# Patient Record
Sex: MALE | URBAN - METROPOLITAN AREA
[De-identification: names, ages, dates, MRNs, and addresses within clinical notes are randomized per-mention and may not be internally consistent; named-entity substitution may affect disease eponyms.]

---

## 2022-09-08 ENCOUNTER — LAB OUTSIDE AN ENCOUNTER (OUTPATIENT)
Dept: URBAN - METROPOLITAN AREA CLINIC 98 | Facility: CLINIC | Age: 47
End: 2022-09-08

## 2022-09-08 ENCOUNTER — OFFICE VISIT (OUTPATIENT)
Dept: URBAN - METROPOLITAN AREA CLINIC 98 | Facility: CLINIC | Age: 47
End: 2022-09-08
Payer: COMMERCIAL

## 2022-09-08 VITALS
HEART RATE: 74 BPM | TEMPERATURE: 97.9 F | SYSTOLIC BLOOD PRESSURE: 110 MMHG | WEIGHT: 172.8 LBS | BODY MASS INDEX: 26.19 KG/M2 | HEIGHT: 68 IN | DIASTOLIC BLOOD PRESSURE: 73 MMHG

## 2022-09-08 DIAGNOSIS — K76.0 FATTY (CHANGE OF) LIVER, NOT ELSEWHERE CLASSIFIED: ICD-10-CM

## 2022-09-08 DIAGNOSIS — Z12.11 COLON CANCER SCREENING: ICD-10-CM

## 2022-09-08 PROCEDURE — 99203 OFFICE O/P NEW LOW 30 MIN: CPT | Performed by: INTERNAL MEDICINE

## 2022-09-08 NOTE — HPI-TODAY'S VISIT:
wife is my patient; he was diagnosed w fatty liver when he was at [facility] a few years ago for kidney stones.  no more issues with stones.  says he feels full easily but otherwise has no complaints.  wt has been stable. denies alcohol use. no medications or supplements

## 2022-09-08 NOTE — HPI-OTHER HISTORIES
CT 2018  EXAM: CT Abdomen + Pelvis w/o IV Contrast  CLINICAL INDICATION: Hydronephrosis of left kidney. Initially presented with fever, left flank pain, dysuria  TECHNIQUE: Noncontrast images were obtained through the abdomen and pelvis.  COMPARISON: 8/27/2018 CT abdomen and pelvis  FINDINGS:  Lower Thorax: Normal. No pleural effusions. Lung bases are clear.  Liver: Severe diffuse hepatic steatosis with sparing of the gallbladder fossa. Otherwise normal size and contour.  Gallbladder/Biliary Tree: Collapsed gallbladder.  Spleen: Normal.  Pancreas: Normal.  Adrenal Glands: Normal.  Kidneys/Ureters: Unchanged partially exophytic 1.2 x 2.3 cm cyst. Interval resolution of mild left hydroureteronephrosis.  Gastrointestinal: Normal appendix. No bowel obstruction.   Bladder: Normal.  Prostate/Seminal Vesicles: Unchanged enlarged prostate measuring up to 5.1 cm in diameter.  Lymph Nodes: Normal.  Vessels: Normal.  Peritoneum/Retroperitoneum: Normal.  Bones/Soft Tissues: Normal.  IMPRESSION:  1.  Interval resolution of mild left hydroureteronephrosis. No nephroureterolithiasis. 2.  Severe diffuse hepatic steatosis. 3.  Prostatomegaly.

## 2022-09-09 LAB
A/G RATIO: 1.9
ALBUMIN: 5
ALKALINE PHOSPHATASE: 68
ALT (SGPT): 83
AST (SGOT): 35
BASO (ABSOLUTE): 0
BASOS: 1
BILIRUBIN, TOTAL: 0.3
BUN/CREATININE RATIO: 9
BUN: 7
CALCIUM: 10
CARBON DIOXIDE, TOTAL: 25
CHLORIDE: 103
CREATININE: 0.76
EGFR: 111
EOS (ABSOLUTE): 0.1
EOS: 3
GGT: 50
GLOBULIN, TOTAL: 2.7
GLUCOSE: 76
HBSAG SCREEN: NEGATIVE
HCV AB: <0.1
HEMATOCRIT: 45.7
HEMATOLOGY COMMENTS:: (no result)
HEMOGLOBIN: 15.4
HEP A AB, TOTAL: POSITIVE
HEP B CORE AB, TOT: NEGATIVE
HEPATITIS B SURF AB QUANT: 37.3
IMMATURE CELLS: (no result)
IMMATURE GRANS (ABS): 0
IMMATURE GRANULOCYTES: 0
INTERPRETATION:: (no result)
LYMPHS (ABSOLUTE): 1.6
LYMPHS: 30
MCH: 30.4
MCHC: 33.7
MCV: 90
MONOCYTES(ABSOLUTE): 0.5
MONOCYTES: 10
NEUTROPHILS (ABSOLUTE): 2.9
NEUTROPHILS: 56
NRBC: (no result)
PLATELETS: 247
POTASSIUM: 4.4
PROTEIN, TOTAL: 7.7
RBC: 5.07
RDW: 13.1
SODIUM: 143
WBC: 5.1

## 2022-09-11 ENCOUNTER — TELEPHONE ENCOUNTER (OUTPATIENT)
Dept: URBAN - METROPOLITAN AREA CLINIC 98 | Facility: CLINIC | Age: 47
End: 2022-09-11

## 2022-09-13 ENCOUNTER — OFFICE VISIT (OUTPATIENT)
Dept: URBAN - METROPOLITAN AREA CLINIC 97 | Facility: CLINIC | Age: 47
End: 2022-09-13

## 2022-09-15 ENCOUNTER — OFFICE VISIT (OUTPATIENT)
Dept: URBAN - METROPOLITAN AREA CLINIC 95 | Facility: CLINIC | Age: 47
End: 2022-09-15
Payer: COMMERCIAL

## 2022-09-15 DIAGNOSIS — K76.0 FATTY (CHANGE OF) LIVER, NOT ELSEWHERE CLASSIFIED: ICD-10-CM

## 2022-09-15 DIAGNOSIS — N28.1 KIDNEY CYST, ACQUIRED: ICD-10-CM

## 2022-09-15 PROCEDURE — 76700 US EXAM ABDOM COMPLETE: CPT | Performed by: INTERNAL MEDICINE

## 2022-09-18 ENCOUNTER — TELEPHONE ENCOUNTER (OUTPATIENT)
Dept: URBAN - METROPOLITAN AREA CLINIC 98 | Facility: CLINIC | Age: 47
End: 2022-09-18

## 2023-03-15 ENCOUNTER — LAB OUTSIDE AN ENCOUNTER (OUTPATIENT)
Dept: URBAN - METROPOLITAN AREA CLINIC 98 | Facility: CLINIC | Age: 48
End: 2023-03-15

## 2023-03-16 LAB
A/G RATIO: 1.8
ALBUMIN: 4.9
ALKALINE PHOSPHATASE: 90
ALT (SGPT): 76
AST (SGOT): 33
BASO (ABSOLUTE): 0
BASOS: 1
BILIRUBIN, TOTAL: 0.4
BUN/CREATININE RATIO: 17
BUN: 14
CALCIUM: 9.9
CARBON DIOXIDE, TOTAL: 25
CHLORIDE: 99
CHOLESTEROL, TOTAL: 203
COMMENT:: (no result)
CREATININE: 0.84
EGFR: 108
EOS (ABSOLUTE): 0.1
EOS: 2
GLOBULIN, TOTAL: 2.7
GLUCOSE: 122
HDL CHOLESTEROL: 39
HEMATOCRIT: 44.1
HEMATOLOGY COMMENTS:: (no result)
HEMOGLOBIN: 15.1
IMMATURE CELLS: (no result)
IMMATURE GRANS (ABS): 0
IMMATURE GRANULOCYTES: 0
LDL CHOL CALC (NIH): 119
LYMPHS (ABSOLUTE): 1.6
LYMPHS: 35
MCH: 30.4
MCHC: 34.2
MCV: 89
MONOCYTES(ABSOLUTE): 0.6
MONOCYTES: 12
NEUTROPHILS (ABSOLUTE): 2.2
NEUTROPHILS: 50
NRBC: (no result)
PLATELETS: 234
POTASSIUM: 4.3
PROTEIN, TOTAL: 7.6
RBC: 4.97
RDW: 12.7
SODIUM: 136
TRIGLYCERIDES: 257
VLDL CHOLESTEROL CAL: 45
WBC: 4.5

## 2023-03-18 ENCOUNTER — LAB OUTSIDE AN ENCOUNTER (OUTPATIENT)
Dept: URBAN - METROPOLITAN AREA CLINIC 98 | Facility: CLINIC | Age: 48
End: 2023-03-18

## 2023-03-23 ENCOUNTER — OFFICE VISIT (OUTPATIENT)
Dept: URBAN - METROPOLITAN AREA CLINIC 98 | Facility: CLINIC | Age: 48
End: 2023-03-23
Payer: COMMERCIAL

## 2023-03-23 VITALS
HEIGHT: 68 IN | TEMPERATURE: 97 F | BODY MASS INDEX: 26.22 KG/M2 | SYSTOLIC BLOOD PRESSURE: 114 MMHG | HEART RATE: 77 BPM | DIASTOLIC BLOOD PRESSURE: 84 MMHG | WEIGHT: 173 LBS

## 2023-03-23 DIAGNOSIS — Z12.11 COLON CANCER SCREENING: ICD-10-CM

## 2023-03-23 DIAGNOSIS — E78.5 DYSLIPIDEMIA: ICD-10-CM

## 2023-03-23 DIAGNOSIS — K76.0 FATTY (CHANGE OF) LIVER, NOT ELSEWHERE CLASSIFIED: ICD-10-CM

## 2023-03-23 PROCEDURE — 99213 OFFICE O/P EST LOW 20 MIN: CPT | Performed by: INTERNAL MEDICINE

## 2023-09-15 LAB
A/G RATIO: 2.2
ALBUMIN: 4.8
ALKALINE PHOSPHATASE: 68
ALT (SGPT): 67
AST (SGOT): 33
BASO (ABSOLUTE): 0
BASOS: 1
BILIRUBIN, TOTAL: 0.5
BUN/CREATININE RATIO: 11
BUN: 10
CALCIUM: 9.3
CARBON DIOXIDE, TOTAL: 22
CHLORIDE: 101
CHOLESTEROL, TOTAL: 128
COMMENT:: (no result)
CREATININE: 0.9
EGFR: 105
EOS (ABSOLUTE): 0.1
EOS: 2
GLOBULIN, TOTAL: 2.2
GLUCOSE: 136
HDL CHOLESTEROL: 40
HEMATOCRIT: 45.2
HEMATOLOGY COMMENTS:: (no result)
HEMOGLOBIN: 14.9
IMMATURE CELLS: (no result)
IMMATURE GRANS (ABS): 0
IMMATURE GRANULOCYTES: 0
LDL CHOL CALC (NIH): 66
LYMPHS (ABSOLUTE): 1.5
LYMPHS: 32
MCH: 30.5
MCHC: 33
MCV: 93
MONOCYTES(ABSOLUTE): 0.5
MONOCYTES: 10
NEUTROPHILS (ABSOLUTE): 2.7
NEUTROPHILS: 55
NRBC: (no result)
PLATELETS: 192
POTASSIUM: 4.6
PROTEIN, TOTAL: 7
RBC: 4.88
RDW: 13.2
SODIUM: 138
TRIGLYCERIDES: 120
VLDL CHOLESTEROL CAL: 22
WBC: 4.9

## 2023-09-21 ENCOUNTER — OFFICE VISIT (OUTPATIENT)
Dept: URBAN - METROPOLITAN AREA CLINIC 98 | Facility: CLINIC | Age: 48
End: 2023-09-21
Payer: COMMERCIAL

## 2023-09-21 VITALS
BODY MASS INDEX: 25.76 KG/M2 | DIASTOLIC BLOOD PRESSURE: 74 MMHG | HEIGHT: 68 IN | WEIGHT: 170 LBS | SYSTOLIC BLOOD PRESSURE: 122 MMHG | HEART RATE: 81 BPM | TEMPERATURE: 98 F

## 2023-09-21 DIAGNOSIS — Z12.11 COLON CANCER SCREENING: ICD-10-CM

## 2023-09-21 DIAGNOSIS — K76.0 FATTY (CHANGE OF) LIVER, NOT ELSEWHERE CLASSIFIED: ICD-10-CM

## 2023-09-21 DIAGNOSIS — E78.5 DYSLIPIDEMIA: ICD-10-CM

## 2023-09-21 PROBLEM — 197321007: Status: ACTIVE | Noted: 2022-09-08

## 2023-09-21 PROBLEM — 370992007: Status: ACTIVE | Noted: 2023-03-23

## 2023-09-21 PROCEDURE — 99213 OFFICE O/P EST LOW 20 MIN: CPT | Performed by: INTERNAL MEDICINE

## 2023-09-21 NOTE — HPI-TODAY'S VISIT:
Does exercise daily : interval  training : jump rope , squats etc  only missed 2 days in last month no more rice and noodle

## 2023-09-23 ENCOUNTER — LAB OUTSIDE AN ENCOUNTER (OUTPATIENT)
Dept: URBAN - METROPOLITAN AREA CLINIC 98 | Facility: CLINIC | Age: 48
End: 2023-09-23

## 2024-03-13 LAB
A/G RATIO: 1.9
ALBUMIN: 5.1
ALKALINE PHOSPHATASE: 77
ALT (SGPT): 86
AST (SGOT): 43
BASO (ABSOLUTE): 0
BASOS: 1
BILIRUBIN, TOTAL: 0.5
BUN/CREATININE RATIO: 17
BUN: 13
CALCIUM: 10
CARBON DIOXIDE, TOTAL: 24
CHLORIDE: 99
CHOLESTEROL, TOTAL: 155
COMMENT:: (no result)
CREATININE: 0.77
EGFR: 110
EOS (ABSOLUTE): 0.1
EOS: 2
GLOBULIN, TOTAL: 2.7
GLUCOSE: 177
HDL CHOLESTEROL: 46
HEMATOCRIT: 45.2
HEMATOLOGY COMMENTS:: (no result)
HEMOGLOBIN: 15.2
IMMATURE CELLS: (no result)
IMMATURE GRANS (ABS): 0
IMMATURE GRANULOCYTES: 0
LDL CHOL CALC (NIH): 77
LYMPHS (ABSOLUTE): 1.5
LYMPHS: 30
MCH: 30.8
MCHC: 33.6
MCV: 92
MONOCYTES(ABSOLUTE): 0.5
MONOCYTES: 9
NEUTROPHILS (ABSOLUTE): 3
NEUTROPHILS: 58
NRBC: (no result)
PLATELETS: 194
POTASSIUM: 5.1
PROTEIN, TOTAL: 7.8
RBC: 4.94
RDW: 12.5
SODIUM: 137
TRIGLYCERIDES: 191
VLDL CHOLESTEROL CAL: 32
WBC: 5.1

## 2024-03-21 ENCOUNTER — LAB (OUTPATIENT)
Dept: URBAN - METROPOLITAN AREA CLINIC 98 | Facility: CLINIC | Age: 49
End: 2024-03-21

## 2024-03-21 ENCOUNTER — OV EP (OUTPATIENT)
Dept: URBAN - METROPOLITAN AREA CLINIC 98 | Facility: CLINIC | Age: 49
End: 2024-03-21

## 2024-08-26 ENCOUNTER — LAB OUTSIDE AN ENCOUNTER (OUTPATIENT)
Dept: URBAN - METROPOLITAN AREA CLINIC 98 | Facility: CLINIC | Age: 49
End: 2024-08-26

## 2024-08-26 ENCOUNTER — OFFICE VISIT (OUTPATIENT)
Dept: URBAN - METROPOLITAN AREA CLINIC 98 | Facility: CLINIC | Age: 49
End: 2024-08-26
Payer: COMMERCIAL

## 2024-08-26 ENCOUNTER — DASHBOARD ENCOUNTERS (OUTPATIENT)
Age: 49
End: 2024-08-26

## 2024-08-26 VITALS
BODY MASS INDEX: 25.43 KG/M2 | WEIGHT: 167.8 LBS | SYSTOLIC BLOOD PRESSURE: 107 MMHG | DIASTOLIC BLOOD PRESSURE: 76 MMHG | TEMPERATURE: 97.4 F | HEIGHT: 68 IN | HEART RATE: 79 BPM

## 2024-08-26 DIAGNOSIS — E78.5 DYSLIPIDEMIA: ICD-10-CM

## 2024-08-26 DIAGNOSIS — K76.0 FATTY (CHANGE OF) LIVER, NOT ELSEWHERE CLASSIFIED: ICD-10-CM

## 2024-08-26 DIAGNOSIS — Z12.11 COLON CANCER SCREENING: ICD-10-CM

## 2024-08-26 PROCEDURE — 99213 OFFICE O/P EST LOW 20 MIN: CPT | Performed by: INTERNAL MEDICINE

## 2024-08-27 LAB
ALBUMIN: 5
ALKALINE PHOSPHATASE: 70
ALT (SGPT): 129
AST (SGOT): 61
BASO (ABSOLUTE): 0
BASOS: 1
BILIRUBIN, TOTAL: 0.6
BUN/CREATININE RATIO: 12
BUN: 11
CALCIUM: 10
CARBON DIOXIDE, TOTAL: 23
CHLORIDE: 101
CREATININE: 0.92
EGFR: 102
EOS (ABSOLUTE): 0.1
EOS: 1
GLOBULIN, TOTAL: 2.5
GLUCOSE: 121
HEMATOCRIT: 43.9
HEMATOLOGY COMMENTS:: (no result)
HEMOGLOBIN: 14.7
IMMATURE CELLS: (no result)
IMMATURE GRANS (ABS): 0
IMMATURE GRANULOCYTES: 0
LYMPHS (ABSOLUTE): 1.6
LYMPHS: 31
MCH: 30.2
MCHC: 33.5
MCV: 90
MONOCYTES(ABSOLUTE): 0.4
MONOCYTES: 8
NEUTROPHILS (ABSOLUTE): 3
NEUTROPHILS: 59
NRBC: (no result)
PLATELETS: 204
POTASSIUM: 4.7
PROTEIN, TOTAL: 7.5
RBC: 4.86
RDW: 12.8
SODIUM: 140
WBC: 5.1

## 2024-08-30 ENCOUNTER — TELEPHONE ENCOUNTER (OUTPATIENT)
Dept: URBAN - METROPOLITAN AREA CLINIC 98 | Facility: CLINIC | Age: 49
End: 2024-08-30

## 2025-02-24 ENCOUNTER — LAB OUTSIDE AN ENCOUNTER (OUTPATIENT)
Dept: URBAN - METROPOLITAN AREA CLINIC 98 | Facility: CLINIC | Age: 50
End: 2025-02-24

## 2025-02-24 ENCOUNTER — OFFICE VISIT (OUTPATIENT)
Dept: URBAN - METROPOLITAN AREA CLINIC 98 | Facility: CLINIC | Age: 50
End: 2025-02-24
Payer: COMMERCIAL

## 2025-02-24 VITALS
HEART RATE: 91 BPM | BODY MASS INDEX: 25.43 KG/M2 | HEIGHT: 68 IN | SYSTOLIC BLOOD PRESSURE: 124 MMHG | WEIGHT: 167.8 LBS | TEMPERATURE: 98.1 F | DIASTOLIC BLOOD PRESSURE: 70 MMHG

## 2025-02-24 DIAGNOSIS — K76.0 FATTY (CHANGE OF) LIVER, NOT ELSEWHERE CLASSIFIED: ICD-10-CM

## 2025-02-24 DIAGNOSIS — Z12.11 COLON CANCER SCREENING: ICD-10-CM

## 2025-02-24 DIAGNOSIS — E78.5 DYSLIPIDEMIA: ICD-10-CM

## 2025-02-24 PROCEDURE — 99214 OFFICE O/P EST MOD 30 MIN: CPT | Performed by: INTERNAL MEDICINE

## 2025-02-24 NOTE — HPI-TODAY'S VISIT:
Last seen 6mths ago : metabolic fatty liver ; has not had ELF scoring or US elastography done as ordered  FIB 4 was 1.32 in Aug stopped carbs in diet : no more rice / breads  trying to exercise more now - winter did stop

## 2025-03-02 ENCOUNTER — TELEPHONE ENCOUNTER (OUTPATIENT)
Dept: URBAN - METROPOLITAN AREA CLINIC 98 | Facility: CLINIC | Age: 50
End: 2025-03-02

## 2025-03-02 NOTE — HPI-TODAY'S VISIT:
[place] EXAM: US ABDOMEN LIMITED W/ELASTOGRAPHY \~ CLINICAL INDICATION: Fatty liver. \~ TECHNIQUE: Axial and longitudinal images were obtained of the right upper abdomen using real-time ultrasound. Elastography measurements were obtained. [id number] \~ COMPARISON: 8/13/2024, US RETROPERITONEUM COMPLETE \~ FINDINGS:  Liver: Increased echogenicity which also obscures portal triads. Decreased through transmission. No definite contour nodularity. No lesions. Portal vein is patent. Hepatic veins are patent. \~ Bile Ducts: No dilated intrahepatic biliary radicles. Common bile duct measures 4 mm. \~ Gallbladder: Normal. Enriquez's sign is negative. \~ Pancreas: Obscured by overlying structures. \~ Right Kidney: Measures 11.3 cm. Normal echogenicity. No hydronephrosis. \~ Other: None. \~ Shear wave elastography based on 10 measurements:  Mean: 6.0 kPa Median: 5.9 kPa Standard deviation: 0.37 kPa \~ IMPRESSION: 1.  Hepatic steatosis. 2.  Elastography measurements consistent with minimal fibrosis (class F1).

## 2025-03-04 ENCOUNTER — TELEPHONE ENCOUNTER (OUTPATIENT)
Dept: URBAN - METROPOLITAN AREA CLINIC 98 | Facility: CLINIC | Age: 50
End: 2025-03-04